# Patient Record
Sex: MALE | Race: WHITE | Employment: OTHER | URBAN - METROPOLITAN AREA
[De-identification: names, ages, dates, MRNs, and addresses within clinical notes are randomized per-mention and may not be internally consistent; named-entity substitution may affect disease eponyms.]

---

## 2017-12-14 ENCOUNTER — TELEPHONE (OUTPATIENT)
Dept: FAMILY MEDICINE CLINIC | Facility: CLINIC | Age: 42
End: 2017-12-14

## 2017-12-14 NOTE — TELEPHONE ENCOUNTER
Pt calling to request refill for medication Ipratropium Bromide (ATROVENT) 0.06 % Nasal Solution      Current Outpatient Prescriptions:  Ipratropium Bromide (ATROVENT) 0.06 % Nasal Solution by Nasal route.  Disp:  Rfl:

## 2017-12-14 NOTE — TELEPHONE ENCOUNTER
LMTCB. Transfer patient to triage. LOV 3 years ago 12/17/2014  Medication listed as external.    Patient needs physical exam scheduled.

## 2021-08-01 ENCOUNTER — HOSPITAL ENCOUNTER (OUTPATIENT)
Age: 46
Discharge: HOME | End: 2021-08-01
Payer: COMMERCIAL

## 2021-08-01 ENCOUNTER — APPOINTMENT (OUTPATIENT)
Dept: GENERAL RADIOLOGY | Age: 46
End: 2021-08-01
Attending: NURSE PRACTITIONER
Payer: COMMERCIAL

## 2021-08-01 VITALS
TEMPERATURE: 98 F | SYSTOLIC BLOOD PRESSURE: 132 MMHG | HEART RATE: 79 BPM | OXYGEN SATURATION: 99 % | DIASTOLIC BLOOD PRESSURE: 77 MMHG | RESPIRATION RATE: 18 BRPM

## 2021-08-01 DIAGNOSIS — M79.645 FINGER PAIN, LEFT: Primary | ICD-10-CM

## 2021-08-01 DIAGNOSIS — M79.89 SWELLING OF MIDDLE FINGER: ICD-10-CM

## 2021-08-01 PROCEDURE — 73130 X-RAY EXAM OF HAND: CPT | Performed by: NURSE PRACTITIONER

## 2021-08-01 PROCEDURE — 99203 OFFICE O/P NEW LOW 30 MIN: CPT | Performed by: NURSE PRACTITIONER

## 2021-08-01 NOTE — ED PROVIDER NOTES
Patient Seen in: [facility]      History   Patient presents with:  Musculoskeletal Problem    Stated Complaint: SWELLING LEFT MIDDLE FINGER    HPI/Subjective:   HPI    This is a 79-year-old male presenting with left middle finger swelling. Conjunctivae normal.   Cardiovascular:      Rate and Rhythm: Normal rate. Pulmonary:      Effort: Pulmonary effort is normal.   Musculoskeletal:         General: Normal range of motion. Hands:       Cervical back: Normal range of motion.       Comm acknowledged understanding instructions.                              Disposition and Plan     Clinical Impression:  Finger pain, left  (primary encounter diagnosis)  Swelling of middle finger     Disposition:  Discharge  8/1/2021  1:19 pm    Follow-up:  Ana Ortiz